# Patient Record
Sex: MALE | Race: WHITE | NOT HISPANIC OR LATINO | Employment: UNEMPLOYED | URBAN - METROPOLITAN AREA
[De-identification: names, ages, dates, MRNs, and addresses within clinical notes are randomized per-mention and may not be internally consistent; named-entity substitution may affect disease eponyms.]

---

## 2021-07-01 ENCOUNTER — OFFICE VISIT (OUTPATIENT)
Dept: URBAN - METROPOLITAN AREA CLINIC 35 | Facility: CLINIC | Age: 21
End: 2021-07-01

## 2021-07-01 VITALS
HEART RATE: 96 BPM | DIASTOLIC BLOOD PRESSURE: 70 MMHG | SYSTOLIC BLOOD PRESSURE: 110 MMHG | BODY MASS INDEX: 22.88 KG/M2 | WEIGHT: 151 LBS | OXYGEN SATURATION: 98 % | HEIGHT: 68 IN

## 2021-07-01 RX ORDER — SODIUM PICOSULFATE, MAGNESIUM OXIDE, AND ANHYDROUS CITRIC ACID 10; 3.5; 12 MG/160ML; G/160ML; G/160ML
160 ML LIQUID ORAL
Qty: 1 KIT | Refills: 0 | OUTPATIENT
Start: 2021-07-01

## 2021-07-01 NOTE — HPI-MIGRATED HPI
;     Diarrhea : 20 year old  patient who presents today for a consultation of diarrhea.  He states he has had diarrhea all his life off and on. Says more often then not.  He states he has tried modifying his diet and tried Pepto Bismol with minimal relief of symptoms. He will have fecal urgency sometimes.  He denies any abdominal pain or discomfort before BMs.  No nocturnal diarrhea.  He denies weight loss.  He will infrequently have normal BMs.    His mother has history of Crohn's diease, and also history of small intestinal cancer.  He admits to having 3 to 6 bowel movements per day. Stools are loose without blood, mucus or melena. He has never had a colonoscopy. ;

## 2021-08-05 ENCOUNTER — OFFICE VISIT (OUTPATIENT)
Dept: URBAN - METROPOLITAN AREA SURGERY CENTER 8 | Facility: SURGERY CENTER | Age: 21
End: 2021-08-05

## 2021-08-19 ENCOUNTER — OFFICE VISIT (OUTPATIENT)
Dept: URBAN - METROPOLITAN AREA CLINIC 35 | Facility: CLINIC | Age: 21
End: 2021-08-19

## 2021-08-26 ENCOUNTER — DASHBOARD ENCOUNTERS (OUTPATIENT)
Age: 21
End: 2021-08-26

## 2021-08-26 ENCOUNTER — OFFICE VISIT (OUTPATIENT)
Dept: URBAN - NONMETROPOLITAN AREA CLINIC 5 | Facility: CLINIC | Age: 21
End: 2021-08-26

## 2021-08-26 VITALS — HEIGHT: 68 IN | WEIGHT: 150 LBS | BODY MASS INDEX: 22.73 KG/M2

## 2021-08-26 PROBLEM — 197125005 IRRITABLE BOWEL SYNDROME WITH DIARRHEA: Status: ACTIVE | Noted: 2021-08-26

## 2021-08-26 RX ORDER — SODIUM PICOSULFATE, MAGNESIUM OXIDE, AND ANHYDROUS CITRIC ACID 10; 3.5; 12 MG/160ML; G/160ML; G/160ML
160 ML LIQUID ORAL
Qty: 1 KIT | Refills: 0 | Status: ON HOLD | COMMUNITY
Start: 2021-07-01

## 2021-08-26 NOTE — HPI-MIGRATED HPI
;     Diarrhea : Patient presents today via televisit with consent for a follow up to his colonoscopy and Diarrhea.  His colonoscopy was completed on 08/05/2021 with Dr Wing results noted below.  He denies any complications after his procedure. Currently has 2-3 bowel movements per day.  Stools are solid and formed without any melena, blood or mucus. He admits his bowel habits have returned to normal. He did not complete his labs order during last visit. He admits improvement in fecal urgency since his procedure.         Last visit 07/01/2021 20 year old  patient who presents today for a consultation of diarrhea.  He states he has had diarrhea all his life off and on. Says more often then not.  He states he has tried modifying his diet and tried Pepto Bismol with minimal relief of symptoms. He will have fecal urgency sometimes.  He denies any abdominal pain or discomfort before BMs.  No nocturnal diarrhea.  He denies weight loss.  He will infrequently have normal BMs.    His mother has history of Crohn's disease, and also history of small intestinal cancer.  He admits to having 3 to 6 bowel movements per day. Stools are loose without blood, mucus or melena. He has never had a colonoscopy.;